# Patient Record
Sex: MALE | Race: WHITE | NOT HISPANIC OR LATINO
[De-identification: names, ages, dates, MRNs, and addresses within clinical notes are randomized per-mention and may not be internally consistent; named-entity substitution may affect disease eponyms.]

---

## 2022-08-11 DIAGNOSIS — Z82.61 FAMILY HISTORY OF ARTHRITIS: ICD-10-CM

## 2022-08-11 DIAGNOSIS — M21.6X1 OTHER ACQUIRED DEFORMITIES OF RIGHT FOOT: ICD-10-CM

## 2022-08-11 DIAGNOSIS — M21.6X2 OTHER ACQUIRED DEFORMITIES OF RIGHT FOOT: ICD-10-CM

## 2022-08-11 RX ORDER — VALSARTAN AND HYDROCHLOROTHIAZIDE 320; 25 MG/1; MG/1
TABLET, FILM COATED ORAL
Refills: 0 | Status: ACTIVE | COMMUNITY

## 2022-08-11 RX ORDER — POTASSIUM CHLORIDE 600 MG/1
TABLET, FILM COATED, EXTENDED RELEASE ORAL
Refills: 0 | Status: ACTIVE | COMMUNITY

## 2022-08-17 ENCOUNTER — APPOINTMENT (OUTPATIENT)
Dept: PODIATRY | Facility: CLINIC | Age: 77
End: 2022-08-17

## 2022-08-17 VITALS — HEIGHT: 72 IN | BODY MASS INDEX: 28.44 KG/M2 | WEIGHT: 210 LBS

## 2022-08-17 PROCEDURE — 11720 DEBRIDE NAIL 1-5: CPT | Mod: Q8,59

## 2022-08-17 PROCEDURE — 11055 PARING/CUTG B9 HYPRKER LES 1: CPT | Mod: Q8

## 2022-08-24 NOTE — HISTORY OF PRESENT ILLNESS
[Sneakers] : jose luis [FreeTextEntry1] : Patient presents today for high risk foot care due to poor circulation. He presents today because of painful preulcerative lesion at the right tailor's bunion and end-stage deformed left mycotic nail. He complains of pain in shoe gear.\par

## 2022-08-24 NOTE — PHYSICAL EXAM
[0] : left foot posterior tibialis 0 [1+] : left foot dorsalis pedis 1+ [Ankle Swelling (On Exam)] : present [Sensation] : the sensory exam was normal to light touch and pinprick [No Focal Deficits] : no focal deficits [Deep Tendon Reflexes (DTR)] : deep tendon reflexes were 2+ and symmetric [Motor Exam] : the motor exam was normal [FreeTextEntry3] : Absent hair growth. Thin, atrophic skin. Nail thickening.  (Q8 class findings) [de-identified] : Equinus. Tight posterior muscle group. Tailor's bunion. Arthritic hammertoes. [FreeTextEntry1] : Left hallux end-stage deformed onychomycotic nail that is yellow, thick, brittle with subungual debris. It is painful at the tibial and fibular border. He has a right sub 5 IPK that is deep seated.

## 2022-08-24 NOTE — ASSESSMENT
[FreeTextEntry1] : \par Treatment: Today's treatment included sharp trimmed of keratotic lesion. I aggressively debrided onychomycotic nails and dystrophic nails. I applied lotion. I discussed more appropriate shoe gear. I encouraged him to walk and soak his feet. Follow-up in the office with continued pain that persists.

## 2022-11-16 ENCOUNTER — APPOINTMENT (OUTPATIENT)
Dept: PODIATRY | Facility: CLINIC | Age: 77
End: 2022-11-16

## 2022-11-16 DIAGNOSIS — I73.9 PERIPHERAL VASCULAR DISEASE, UNSPECIFIED: ICD-10-CM

## 2022-11-16 PROCEDURE — 11056 PARNG/CUTG B9 HYPRKR LES 2-4: CPT | Mod: Q8

## 2022-11-16 PROCEDURE — 11720 DEBRIDE NAIL 1-5: CPT | Mod: Q8,59

## 2022-11-17 PROBLEM — I73.9 PERIPHERAL VASCULAR DISEASE: Status: ACTIVE | Noted: 2022-08-22

## 2022-11-18 NOTE — ASSESSMENT
[FreeTextEntry1] : \par Impression: Onychomycosis. PVD. IPK. Billy's bunion.\par \par Treatment: On this patient who has a foot at risk because of poor circulation, I sharply trimmed keratotic lesions without incident. I aggressively debrided and debulked mycotic nails and dystrophic nails. I encouraged him to walk as much as possible. I encouraged him to soak with warm water and Epsom salt through the winter months and continue orthopedic extra width shoes.\par \par

## 2022-11-18 NOTE — PHYSICAL EXAM
[Pes Planus] : pes planus deformity [Skin Color & Pigmentation] : normal skin color and pigmentation [Skin Turgor] : normal skin turgor [0] : left foot posterior tibialis 0 [1+] : left foot dorsalis pedis 1+ [Sensation] : the sensory exam was normal to light touch and pinprick [No Focal Deficits] : no focal deficits [Deep Tendon Reflexes (DTR)] : deep tendon reflexes were 2+ and symmetric [FreeTextEntry3] : Absent hair growth.  Thin, atrophic skin. The patient has a class finding of Q8-Two Class B findings.  [de-identified] : Tailor's bunion painful keratosis.  [FreeTextEntry1] : Sub 5 IPK's bilateral. They are deep seated and inflamed. Onychomycosis in nails 1 and 2 on the left. They are thick, brittle with subungual debris and mycosis. Pain at the tips and tops.

## 2022-11-18 NOTE — HISTORY OF PRESENT ILLNESS
[Sneakers] : jose luis [FreeTextEntry1] : Patient presents today for high risk foot care  due to poor circulation. He preulcerative keratotic lesion at the sub 5 area and onychomycotic nails in a lot of the nails but the ones that are painful are 1 and 2.

## 2023-02-22 ENCOUNTER — APPOINTMENT (OUTPATIENT)
Dept: PODIATRY | Facility: CLINIC | Age: 78
End: 2023-02-22
Payer: MEDICARE

## 2023-02-22 PROCEDURE — 11720 DEBRIDE NAIL 1-5: CPT | Mod: Q8,59

## 2023-02-22 PROCEDURE — 11056 PARNG/CUTG B9 HYPRKR LES 2-4: CPT | Mod: Q8

## 2023-02-24 NOTE — ASSESSMENT
[FreeTextEntry1] : \par Impression: Keratosis. Onychomycosis. Pain. PVD.\par \par Treatment: I encouraged him to walk. I encouraged him to soak the foot with warm water and Epsom salt.  I enucleated keratotic lesions x2. His shoes are orthopedic and adequate. I debrided dystrophic and mycotic nails without incident. I applied Naftin gel and lotion. he will follow-up in the office as needed.

## 2023-02-24 NOTE — HISTORY OF PRESENT ILLNESS
[Sneakers] : jose luis [FreeTextEntry1] : Patient presents today for high risk foot care. He has left hallux end-stage onychomycotic nail and preulcerative keratotic lesion at the sub 5 area. Patient last saw Dr. Hodges 1/19/2023.\par \par

## 2023-02-24 NOTE — PHYSICAL EXAM
[0] : left foot posterior tibialis 0 [1+] : left foot dorsalis pedis 1+ [Skin Color & Pigmentation] : normal skin color and pigmentation [Skin Turgor] : normal skin turgor [Sensation] : the sensory exam was normal to light touch and pinprick [No Focal Deficits] : no focal deficits [Deep Tendon Reflexes (DTR)] : deep tendon reflexes were 2+ and symmetric [FreeTextEntry3] : Absent hair growth.  Thin, atrophic skin. The patient has a class finding of Q8-Two Class B findings.  [de-identified] : Submetatarsalgia with tailor's bunion, equinus and painful sub 5.  [FreeTextEntry1] : Sub 5 IPK's bilateral. They are deep seated and inflamed. End-stage onychomycotic left hallux and 2nd toenail. They are thick, brittle with subungual debris and mycosis. Pain at the tip and top. Dystrophic nails bilateral.

## 2023-05-24 ENCOUNTER — APPOINTMENT (OUTPATIENT)
Dept: PODIATRY | Facility: CLINIC | Age: 78
End: 2023-05-24
Payer: MEDICARE

## 2023-05-24 PROCEDURE — 11720 DEBRIDE NAIL 1-5: CPT | Mod: Q8,59

## 2023-05-24 PROCEDURE — 11056 PARNG/CUTG B9 HYPRKR LES 2-4: CPT | Mod: Q8

## 2023-05-26 NOTE — HISTORY OF PRESENT ILLNESS
[Sneakers] : jose luis [FreeTextEntry1] : Patient presents today S/P recent cataract surgery. He presents today for high risk foot care.  He complains of worsening pain and onychomycotic nails.

## 2023-05-26 NOTE — PHYSICAL EXAM
[0] : left foot posterior tibialis 0 [1+] : left foot dorsalis pedis 1+ [Skin Color & Pigmentation] : normal skin color and pigmentation [Skin Turgor] : normal skin turgor [Sensation] : the sensory exam was normal to light touch and pinprick [No Focal Deficits] : no focal deficits [Deep Tendon Reflexes (DTR)] : deep tendon reflexes were 2+ and symmetric [FreeTextEntry3] : Absent hair growth.  Thin, atrophic skin. The patient has a class finding of Q8-Two Class B findings.  [de-identified] : Submetatarsalgia with tailor's bunion, equinus and painful sub 5.  [FreeTextEntry1] : Sub 5 IPK's bilateral. They are deep seated and inflamed. End-stage onychomycotic left hallux and 2nd toenail. They are thick, brittle with subungual debris and mycosis. Pain at the tip and top. Dystrophic nails bilateral.

## 2023-05-26 NOTE — ASSESSMENT
[FreeTextEntry1] : \par Impression: Keratosis. Onychomycosis. Pain. PVD.\par \par Treatment: I encouraged him to walk. I encouraged him to soak the foot with warm water and Epsom salt.  I enucleated keratotic lesions x2. His shoes are orthopedic and adequate. I debrided dystrophic and mycotic nails without incident. I applied Naftin gel and lotion. I encouraged him to walk as much as possible. He will follow-up in the office as needed.

## 2023-09-27 ENCOUNTER — APPOINTMENT (OUTPATIENT)
Dept: PODIATRY | Facility: CLINIC | Age: 78
End: 2023-09-27
Payer: MEDICARE

## 2023-09-27 PROCEDURE — 11720 DEBRIDE NAIL 1-5: CPT | Mod: Q8,59

## 2023-09-27 PROCEDURE — 11056 PARNG/CUTG B9 HYPRKR LES 2-4: CPT | Mod: Q8

## 2023-12-20 ENCOUNTER — APPOINTMENT (OUTPATIENT)
Dept: PODIATRY | Facility: CLINIC | Age: 78
End: 2023-12-20
Payer: MEDICARE

## 2023-12-20 DIAGNOSIS — M79.674 TINEA UNGUIUM: ICD-10-CM

## 2023-12-20 DIAGNOSIS — M79.675 TINEA UNGUIUM: ICD-10-CM

## 2023-12-20 DIAGNOSIS — B35.1 TINEA UNGUIUM: ICD-10-CM

## 2023-12-20 PROCEDURE — 11720 DEBRIDE NAIL 1-5: CPT | Mod: Q8,59

## 2023-12-20 PROCEDURE — 11056 PARNG/CUTG B9 HYPRKR LES 2-4: CPT | Mod: Q8

## 2023-12-21 PROBLEM — B35.1 PAIN DUE TO ONYCHOMYCOSIS OF TOENAILS OF BOTH FEET: Status: ACTIVE | Noted: 2022-08-11

## 2023-12-22 NOTE — ASSESSMENT
[FreeTextEntry1] : Impression:  Onychomycosis. IPK. Pain. PVD.  Treatment: I encouraged him to walk. I encouraged him to soak the foot with warm water and Epsom salt.  I enucleated keratotic lesions x2. His shoes are orthopedic and adequate. I debrided dystrophic and mycotic nails without incident. I applied Naftin gel and lotion. I encouraged him to walk as much as possible. He will follow-up in the office as needed.

## 2023-12-22 NOTE — HISTORY OF PRESENT ILLNESS
[Sneakers] : jose luis [FreeTextEntry1] : Patient presents today for high-risk foot care. He has an at-risk foot due to poor circulation. He has worsening keratosis and onychomycosis that he cannot care for himself.   The patient's history and physical has been reviewed and verified with no changes.

## 2023-12-22 NOTE — PHYSICAL EXAM
[0] : left foot posterior tibialis 0 [1+] : left foot dorsalis pedis 1+ [Skin Color & Pigmentation] : normal skin color and pigmentation [Skin Turgor] : normal skin turgor [Sensation] : the sensory exam was normal to light touch and pinprick [No Focal Deficits] : no focal deficits [Deep Tendon Reflexes (DTR)] : deep tendon reflexes were 2+ and symmetric [FreeTextEntry3] : Absent hair growth.  Thin, atrophic skin. The patient has a class finding of Q8-Two Class B findings.  [de-identified] : Submetatarsalgia with tailor's bunion, equinus and painful sub 5.  [FreeTextEntry1] : End-stage onychomycotic left hallux and 2nd toenail. They are thick, brittle with subungual debris and mycosis. Pain at the tip and top. Dystrophic nails bilateral. Sub 5 IPK's bilateral. They are deep seated and inflamed.

## 2024-03-20 ENCOUNTER — APPOINTMENT (OUTPATIENT)
Dept: PODIATRY | Facility: CLINIC | Age: 79
End: 2024-03-20
Payer: MEDICARE

## 2024-03-20 DIAGNOSIS — M77.41 METATARSALGIA, RIGHT FOOT: ICD-10-CM

## 2024-03-20 DIAGNOSIS — M21.622 BUNIONETTE OF RIGHT FOOT: ICD-10-CM

## 2024-03-20 DIAGNOSIS — M21.621 BUNIONETTE OF RIGHT FOOT: ICD-10-CM

## 2024-03-20 PROCEDURE — 99212 OFFICE O/P EST SF 10 MIN: CPT

## 2024-03-21 PROBLEM — M21.621 TAILOR'S BUNION OF BOTH FEET: Status: ACTIVE | Noted: 2022-08-11

## 2024-03-22 PROBLEM — M77.41 METATARSALGIA OF RIGHT FOOT: Status: ACTIVE | Noted: 2024-03-21

## 2024-03-22 NOTE — HISTORY OF PRESENT ILLNESS
[FreeTextEntry1] : Patient presents today because of metatarsalgia of the right foot at the tailor's bunion. It has been prominent and causing problems. He has been walking funny because of sciatica. He has treatment for that tomorrow.

## 2024-06-18 ENCOUNTER — APPOINTMENT (OUTPATIENT)
Dept: PODIATRY | Facility: CLINIC | Age: 79
End: 2024-06-18
Payer: MEDICARE

## 2024-06-18 DIAGNOSIS — M79.675 PAIN IN RIGHT TOE(S): ICD-10-CM

## 2024-06-18 DIAGNOSIS — I70.91 GENERALIZED ATHEROSCLEROSIS: ICD-10-CM

## 2024-06-18 DIAGNOSIS — M79.674 PAIN IN RIGHT TOE(S): ICD-10-CM

## 2024-06-18 DIAGNOSIS — L85.1 ACQUIRED KERATOSIS [KERATODERMA] PALMARIS ET PLANTARIS: ICD-10-CM

## 2024-06-18 DIAGNOSIS — B35.1 TINEA UNGUIUM: ICD-10-CM

## 2024-06-18 PROCEDURE — 11720 DEBRIDE NAIL 1-5: CPT | Mod: Q8,59

## 2024-06-18 PROCEDURE — 11056 PARNG/CUTG B9 HYPRKR LES 2-4: CPT | Mod: Q8

## 2024-06-19 PROBLEM — L85.1 KERATODERMA, ACQUIRED: Status: ACTIVE | Noted: 2022-08-22

## 2024-06-19 PROBLEM — I70.91 GENERALIZED ATHEROSCLEROSIS: Status: ACTIVE | Noted: 2022-08-22

## 2024-06-19 PROBLEM — B35.1 ONYCHOMYCOSIS: Status: ACTIVE | Noted: 2022-11-17

## 2024-06-21 PROBLEM — M79.674 PAIN IN TOES OF BOTH FEET: Status: ACTIVE | Noted: 2024-06-19

## 2024-06-21 NOTE — PHYSICAL EXAM
[0] : left foot posterior tibialis 0 [1+] : left foot dorsalis pedis 1+ [Sensation] : the sensory exam was normal to light touch and pinprick [No Focal Deficits] : no focal deficits [Deep Tendon Reflexes (DTR)] : deep tendon reflexes were 2+ and symmetric [FreeTextEntry3] : Absent hair growth.  Thin, atrophic skin. The patient has a class finding of Q8-Two Class B findings.  [de-identified] : Submetatarsalgia with tailor's bunion, equinus and painful sub 5 IPK. [FreeTextEntry1] : Sub 5 IPK that is deep seated. Bilateral tailor's bunion with the right side being more painful than the left. He has dystrophic, elongated, thickened nails bilateral. Hallux mycotic nails bilateral that are elongated, thick, discolored, and painful at the top, tip and tibial border.

## 2024-06-21 NOTE — ASSESSMENT
[FreeTextEntry1] : Impression: Generalized atherosclerosis. IPK. Onychomycosis. Pain.  Treatment: I manually and mechanically debrided mycotic nails x2 using a small straight nail splitter and rotary [place]. The nails were aggressively debrided and debulked to make them comfortable in shoe gear. Dystrophic nails were trimmed to hygienic length.  I enucleated the keratotic lesion after prepping with alcohol. I put an aperture pad on. Discussed appropriate shoe gear. Patient to return in 3 months. Call with any problems.

## 2024-06-21 NOTE — HISTORY OF PRESENT ILLNESS
[FreeTextEntry1] : Patient presents today for high-risk foot care. He has sub 5 preulcerative keratotic lesions and onychomycotic nails that he cannot care for himself. He has a high-risk foot due to PVD. The patient's history and physical has been reviewed and verified with no changes.

## 2024-09-17 ENCOUNTER — APPOINTMENT (OUTPATIENT)
Dept: PODIATRY | Facility: CLINIC | Age: 79
End: 2024-09-17
Payer: MEDICARE

## 2024-09-17 DIAGNOSIS — S90.112A CONTUSION OF LEFT GREAT TOE W/OUT DAMAGE TO NAIL, INITIAL ENCOUNTER: ICD-10-CM

## 2024-09-17 PROCEDURE — 99212 OFFICE O/P EST SF 10 MIN: CPT

## 2024-09-20 PROBLEM — S90.112A CONTUSION OF GREAT TOE, LEFT: Status: ACTIVE | Noted: 2024-09-19

## 2024-09-20 NOTE — ASSESSMENT
[FreeTextEntry1] : Impression:  Contusion left hallux.  Treatment: The nail was prepped, and I debrided the nail and was able to drain the active infected part. I put an antibiotic dressing on. He could soak with warm water and Epsom salt, use Neosporin and keep it clean. Follow-up in the office with issues that persist.

## 2024-09-20 NOTE — HISTORY OF PRESENT ILLNESS
[FreeTextEntry1] : Patient presents today. He has a distal subungual hematoma at the distal 20% of the left hallux nail. The nail is thick. He banged it. There is distal subungual hematoma. No pus, infection or cellulitis.

## 2024-09-20 NOTE — PHYSICAL EXAM
[0] : left foot posterior tibialis 0 [1+] : left foot dorsalis pedis 1+ [Sensation] : the sensory exam was normal to light touch and pinprick [No Focal Deficits] : no focal deficits [Deep Tendon Reflexes (DTR)] : deep tendon reflexes were 2+ and symmetric [FreeTextEntry3] : Absent hair growth.  Thin, atrophic skin. The patient has a class finding of Q8-Two Class B findings.  [FreeTextEntry1] : Distal subungual hematoma at the distal 20% of the left hallux nail.

## 2024-12-17 ENCOUNTER — APPOINTMENT (OUTPATIENT)
Dept: PODIATRY | Facility: CLINIC | Age: 79
End: 2024-12-17
Payer: MEDICARE

## 2024-12-17 DIAGNOSIS — M21.621 BUNIONETTE OF RIGHT FOOT: ICD-10-CM

## 2024-12-17 DIAGNOSIS — M21.622 BUNIONETTE OF RIGHT FOOT: ICD-10-CM

## 2024-12-17 DIAGNOSIS — M77.50 OTHER ENTHESOPATHY OF UNSPCFD FOOT AND ANKLE: ICD-10-CM

## 2024-12-17 PROCEDURE — 99212 OFFICE O/P EST SF 10 MIN: CPT

## 2024-12-20 PROBLEM — M77.50 BURSITIS, FOOT: Status: ACTIVE | Noted: 2024-12-19

## 2025-04-30 ENCOUNTER — APPOINTMENT (OUTPATIENT)
Dept: PODIATRY | Facility: CLINIC | Age: 80
End: 2025-04-30

## 2025-04-30 DIAGNOSIS — L85.1 ACQUIRED KERATOSIS [KERATODERMA] PALMARIS ET PLANTARIS: ICD-10-CM

## 2025-04-30 DIAGNOSIS — B35.1 TINEA UNGUIUM: ICD-10-CM

## 2025-04-30 DIAGNOSIS — M79.674 PAIN IN RIGHT TOE(S): ICD-10-CM

## 2025-04-30 DIAGNOSIS — M79.675 PAIN IN RIGHT TOE(S): ICD-10-CM

## 2025-04-30 DIAGNOSIS — M79.674 TINEA UNGUIUM: ICD-10-CM

## 2025-04-30 DIAGNOSIS — M79.675 TINEA UNGUIUM: ICD-10-CM

## 2025-04-30 DIAGNOSIS — E11.49 TYPE 2 DIABETES MELLITUS WITH OTHER DIABETIC NEUROLOGICAL COMPLICATION: ICD-10-CM

## 2025-04-30 PROCEDURE — 11720 DEBRIDE NAIL 1-5: CPT | Mod: Q9,59

## 2025-04-30 PROCEDURE — 11056 PARNG/CUTG B9 HYPRKR LES 2-4: CPT | Mod: Q9

## 2025-05-05 PROBLEM — E11.49 DM (DIABETES MELLITUS), TYPE 2 WITH NEUROLOGICAL COMPLICATIONS: Status: ACTIVE | Noted: 2025-05-02
